# Patient Record
Sex: FEMALE | Race: WHITE | URBAN - METROPOLITAN AREA
[De-identification: names, ages, dates, MRNs, and addresses within clinical notes are randomized per-mention and may not be internally consistent; named-entity substitution may affect disease eponyms.]

---

## 2022-11-01 ENCOUNTER — AMBULATORY SURGERY CENTER (OUTPATIENT)
Dept: URBAN - METROPOLITAN AREA SURGERY 27 | Facility: SURGERY | Age: 67
Setting detail: OPHTHALMOLOGY
End: 2022-11-01
Payer: MEDICARE

## 2022-11-01 DIAGNOSIS — H52.212: ICD-10-CM

## 2022-11-01 DIAGNOSIS — H25.12: ICD-10-CM

## 2022-11-01 PROCEDURE — FEMTO CATARACT LASER: Performed by: OPHTHALMOLOGY

## 2022-11-01 PROCEDURE — 66984 XCAPSL CTRC RMVL W/O ECP: CPT | Performed by: OPHTHALMOLOGY

## 2022-11-02 ENCOUNTER — RX ONLY (RX ONLY)
Age: 67
End: 2022-11-02

## 2022-11-02 ENCOUNTER — OFFICE (OUTPATIENT)
Dept: URBAN - METROPOLITAN AREA CLINIC 12 | Facility: CLINIC | Age: 67
Setting detail: OPHTHALMOLOGY
End: 2022-11-02
Payer: MEDICARE

## 2022-11-02 DIAGNOSIS — Z96.1: ICD-10-CM

## 2022-11-02 PROCEDURE — 99024 POSTOP FOLLOW-UP VISIT: CPT | Performed by: OPTOMETRIST

## 2022-11-02 ASSESSMENT — SPHEQUIV_DERIVED
OS_SPHEQUIV: -2.5
OD_SPHEQUIV: -2.5
OS_SPHEQUIV: -2.875
OD_SPHEQUIV: -2.875

## 2022-11-02 ASSESSMENT — REFRACTION_CURRENTRX
OD_SPHERE: -1.75
OS_VPRISM_DIRECTION: PROGS
OS_CYLINDER: -1.25
OS_AXIS: 137
OD_VPRISM_DIRECTION: PROGS
OD_CYLINDER: -0.25
OS_SPHERE: -2.25
OS_OVR_VA: 20/
OD_AXIS: 92
OD_ADD: +3.00
OS_ADD: +3.00
OD_OVR_VA: 20/

## 2022-11-02 ASSESSMENT — AXIALLENGTH_DERIVED
OS_AL: 23.7404
OD_AL: 23.8338
OD_AL: 23.9838
OS_AL: 23.8893

## 2022-11-02 ASSESSMENT — REFRACTION_MANIFEST
OD_SPHERE: -2.50
OS_VA1: 20/25
OD_VA1: 20/25-1
OD_AXIS: 059
OD_CYLINDER: -0.75
OS_AXIS: 133
OS_SPHERE: -2.00
OS_CYLINDER: -1.75

## 2022-11-02 ASSESSMENT — VISUAL ACUITY
OD_BCVA: 20/20
OS_BCVA: 20/20-2

## 2022-11-02 ASSESSMENT — REFRACTION_AUTOREFRACTION
OS_AXIS: 124
OS_CYLINDER: -0.50
OD_SPHERE: -2.25
OS_SPHERE: -2.25
OD_AXIS: 038
OD_CYLINDER: -0.50

## 2022-11-02 ASSESSMENT — KERATOMETRY
OD_AXISANGLE_DEGREES: 098
OD_K1POWER_DIOPTERS: 45.25
OS_K2POWER_DIOPTERS: 46.00
OD_K2POWER_DIOPTERS: 45.75
OS_K1POWER_DIOPTERS: 45.50
METHOD_AUTO_MANUAL: AUTO
OS_AXISANGLE_DEGREES: 066

## 2022-11-02 ASSESSMENT — CONFRONTATIONAL VISUAL FIELD TEST (CVF)
OS_FINDINGS: FULL
OD_FINDINGS: FULL

## 2022-11-02 ASSESSMENT — TONOMETRY
OD_IOP_MMHG: 21
OS_IOP_MMHG: 18

## 2022-11-08 ENCOUNTER — OFFICE (OUTPATIENT)
Dept: URBAN - METROPOLITAN AREA CLINIC 12 | Facility: CLINIC | Age: 67
Setting detail: OPHTHALMOLOGY
End: 2022-11-08
Payer: MEDICARE

## 2022-11-08 DIAGNOSIS — Z96.1: ICD-10-CM

## 2022-11-08 PROCEDURE — 99024 POSTOP FOLLOW-UP VISIT: CPT | Performed by: OPTOMETRIST

## 2022-11-08 ASSESSMENT — REFRACTION_MANIFEST
OD_AXIS: 059
OS_SPHERE: -2.00
OS_AXIS: 133
OS_CYLINDER: -1.75
OS_VA1: 20/25
OD_VA1: 20/25-1
OD_CYLINDER: -0.75
OD_SPHERE: -2.50

## 2022-11-08 ASSESSMENT — AXIALLENGTH_DERIVED
OD_AL: 24.0313
OS_AL: 23.9838
OS_AL: 23.7842
OD_AL: 23.9307

## 2022-11-08 ASSESSMENT — REFRACTION_AUTOREFRACTION
OD_SPHERE: -2.50
OD_AXIS: 067
OS_SPHERE: -2.25
OD_CYLINDER: -0.25
OS_CYLINDER: -0.25
OS_AXIS: 142

## 2022-11-08 ASSESSMENT — KERATOMETRY
OS_K1POWER_DIOPTERS: 45.25
OS_K2POWER_DIOPTERS: 45.75
OD_K2POWER_DIOPTERS: 45.50
METHOD_AUTO_MANUAL: AUTO
OD_AXISANGLE_DEGREES: 072
OD_K1POWER_DIOPTERS: 45.25
OS_AXISANGLE_DEGREES: 083

## 2022-11-08 ASSESSMENT — REFRACTION_CURRENTRX
OS_CYLINDER: -1.25
OS_VPRISM_DIRECTION: PROGS
OD_CYLINDER: -0.25
OS_SPHERE: -2.25
OD_SPHERE: -1.75
OS_AXIS: 137
OD_OVR_VA: 20/
OS_OVR_VA: 20/
OD_VPRISM_DIRECTION: PROGS
OD_ADD: +3.00
OS_ADD: +3.00
OD_AXIS: 92

## 2022-11-08 ASSESSMENT — CONFRONTATIONAL VISUAL FIELD TEST (CVF)
OD_FINDINGS: FULL
OS_FINDINGS: FULL

## 2022-11-08 ASSESSMENT — SPHEQUIV_DERIVED
OS_SPHEQUIV: -2.375
OS_SPHEQUIV: -2.875
OD_SPHEQUIV: -2.625
OD_SPHEQUIV: -2.875

## 2022-11-08 ASSESSMENT — VISUAL ACUITY
OD_BCVA: 20/200
OS_BCVA: 20/20-1

## 2022-12-02 ENCOUNTER — OFFICE (OUTPATIENT)
Dept: URBAN - METROPOLITAN AREA CLINIC 12 | Facility: CLINIC | Age: 67
Setting detail: OPHTHALMOLOGY
End: 2022-12-02
Payer: MEDICARE

## 2022-12-02 DIAGNOSIS — Z96.1: ICD-10-CM

## 2022-12-02 PROBLEM — H52.7 REFRACTIVE ERROR: Status: ACTIVE | Noted: 2022-12-02

## 2022-12-02 PROCEDURE — 99024 POSTOP FOLLOW-UP VISIT: CPT | Performed by: OPTOMETRIST

## 2022-12-02 ASSESSMENT — REFRACTION_CURRENTRX
OD_SPHERE: -1.75
OS_ADD: +3.00
OD_OVR_VA: 20/
OS_SPHERE: -2.75
OD_SPHERE: -2.75
OD_ADD: +3.00
OS_VPRISM_DIRECTION: PROGS
OD_AXIS: 060
OD_CYLINDER: -0.25
OS_CYLINDER: -1.00
OS_OVR_VA: 20/
OS_AXIS: 137
OS_OVR_VA: 20/
OD_ADD: +2.75
OD_VPRISM_DIRECTION: PROGS
OS_ADD: +2.75
OS_CYLINDER: -1.25
OD_OVR_VA: 20/
OS_SPHERE: -2.25
OD_AXIS: 92
OS_AXIS: 128
OD_CYLINDER: -1.00

## 2022-12-02 ASSESSMENT — REFRACTION_AUTOREFRACTION
OD_CYLINDER: -0.50
OD_AXIS: 80
OD_SPHERE: -2.50
OS_CYLINDER: -0.25
OS_SPHERE: -2.50
OS_AXIS: 141

## 2022-12-02 ASSESSMENT — KERATOMETRY
OD_K1POWER_DIOPTERS: 45.25
OD_K2POWER_DIOPTERS: 45.25
OS_AXISANGLE_DEGREES: 76
OD_AXISANGLE_DEGREES: 90
METHOD_AUTO_MANUAL: AUTO
OS_K2POWER_DIOPTERS: 45.75
OS_K1POWER_DIOPTERS: 45.25

## 2022-12-02 ASSESSMENT — VISUAL ACUITY
OS_BCVA: 20/100
OD_BCVA: 20/150

## 2022-12-02 ASSESSMENT — SPHEQUIV_DERIVED
OS_SPHEQUIV: -2.625
OD_SPHEQUIV: -2.75
OD_SPHEQUIV: -2.75
OS_SPHEQUIV: -2.75

## 2022-12-02 ASSESSMENT — REFRACTION_MANIFEST
OD_ADD: +2.75
OS_CYLINDER: -0.50
OD_CYLINDER: -0.50
OS_VA1: 20/25
OS_AXIS: 140
OD_VA1: 20/25
OD_SPHERE: -2.50
OS_ADD: +2.75
OD_AXIS: 080
OS_SPHERE: -2.50

## 2022-12-02 ASSESSMENT — TONOMETRY
OS_IOP_MMHG: 18
OD_IOP_MMHG: 20

## 2022-12-02 ASSESSMENT — AXIALLENGTH_DERIVED
OS_AL: 23.8836
OS_AL: 23.9336
OD_AL: 24.0285
OD_AL: 24.0285

## 2022-12-02 ASSESSMENT — CONFRONTATIONAL VISUAL FIELD TEST (CVF)
OD_FINDINGS: FULL
OS_FINDINGS: FULL

## 2023-03-03 ENCOUNTER — OFFICE (OUTPATIENT)
Dept: URBAN - METROPOLITAN AREA CLINIC 12 | Facility: CLINIC | Age: 68
Setting detail: OPHTHALMOLOGY
End: 2023-03-03
Payer: MEDICARE

## 2023-03-03 DIAGNOSIS — Z96.1: ICD-10-CM

## 2023-03-03 DIAGNOSIS — H43.22: ICD-10-CM

## 2023-03-03 DIAGNOSIS — H35.373: ICD-10-CM

## 2023-03-03 DIAGNOSIS — H43.813: ICD-10-CM

## 2023-03-03 DIAGNOSIS — H26.491: ICD-10-CM

## 2023-03-03 PROBLEM — H26.493 POSTERIOR CAPSULAR OPACIFICATION; BOTH EYES: Status: ACTIVE | Noted: 2023-03-03

## 2023-03-03 PROCEDURE — 92134 CPTRZ OPH DX IMG PST SGM RTA: CPT | Performed by: OPTOMETRIST

## 2023-03-03 PROCEDURE — 92014 COMPRE OPH EXAM EST PT 1/>: CPT | Performed by: OPTOMETRIST

## 2023-03-03 ASSESSMENT — VISUAL ACUITY
OS_BCVA: 20/20-3
OD_BCVA: 20/25-2

## 2023-03-03 ASSESSMENT — REFRACTION_AUTOREFRACTION
OS_CYLINDER: -0.25
OD_SPHERE: -2.25
OD_CYLINDER: -0.50
OS_AXIS: 110
OD_AXIS: 091
OS_SPHERE: -2.25

## 2023-03-03 ASSESSMENT — REFRACTION_CURRENTRX
OD_OVR_VA: 20/
OD_CYLINDER: -0.50
OS_AXIS: 128
OD_CYLINDER: -1.00
OD_VPRISM_DIRECTION: PROGS
OD_ADD: +2.75
OS_VPRISM_DIRECTION: PROGS
OD_AXIS: 080
OS_SPHERE: -2.75
OS_SPHERE: -2.50
OD_ADD: +2.75
OD_OVR_VA: 20/
OS_CYLINDER: -1.00
OS_AXIS: 140
OS_OVR_VA: 20/
OS_CYLINDER: -0.50
OD_AXIS: 060
OD_SPHERE: -2.50
OS_OVR_VA: 20/
OS_ADD: +2.75
OD_SPHERE: -2.75
OS_ADD: +2.75

## 2023-03-03 ASSESSMENT — KERATOMETRY
OD_AXISANGLE_DEGREES: 090
OS_K2POWER_DIOPTERS: 45.75
METHOD_AUTO_MANUAL: AUTO
OD_K2POWER_DIOPTERS: 45.25
OD_K1POWER_DIOPTERS: 45.25
OS_AXISANGLE_DEGREES: 027
OS_K1POWER_DIOPTERS: 45.50

## 2023-03-03 ASSESSMENT — REFRACTION_MANIFEST
OS_CYLINDER: -0.50
OD_VA1: 20/25
OS_ADD: +2.75
OD_SPHERE: -2.50
OS_VA1: 20/25
OD_ADD: +2.75
OD_AXIS: 080
OS_SPHERE: -2.50
OD_CYLINDER: -0.50
OS_AXIS: 140

## 2023-03-03 ASSESSMENT — SPHEQUIV_DERIVED
OD_SPHEQUIV: -2.5
OD_SPHEQUIV: -2.75
OS_SPHEQUIV: -2.375
OS_SPHEQUIV: -2.75

## 2023-03-03 ASSESSMENT — TONOMETRY: OS_IOP_MMHG: 19

## 2023-03-03 ASSESSMENT — CONFRONTATIONAL VISUAL FIELD TEST (CVF)
OS_FINDINGS: FULL
OD_FINDINGS: FULL

## 2023-03-03 ASSESSMENT — AXIALLENGTH_DERIVED
OD_AL: 24.0285
OS_AL: 23.7376
OS_AL: 23.8864
OD_AL: 23.9278

## 2023-05-08 ENCOUNTER — FORM ENCOUNTER (OUTPATIENT)
Age: 68
End: 2023-05-08

## 2023-05-08 ENCOUNTER — APPOINTMENT (OUTPATIENT)
Dept: ORTHOPEDIC SURGERY | Facility: CLINIC | Age: 68
End: 2023-05-08
Payer: OTHER MISCELLANEOUS

## 2023-05-08 VITALS — BODY MASS INDEX: 26.46 KG/M2 | HEIGHT: 64 IN | WEIGHT: 155 LBS

## 2023-05-08 DIAGNOSIS — Z87.891 PERSONAL HISTORY OF NICOTINE DEPENDENCE: ICD-10-CM

## 2023-05-08 DIAGNOSIS — Z78.9 OTHER SPECIFIED HEALTH STATUS: ICD-10-CM

## 2023-05-08 PROBLEM — Z00.00 ENCOUNTER FOR PREVENTIVE HEALTH EXAMINATION: Status: ACTIVE | Noted: 2023-05-08

## 2023-05-08 PROCEDURE — 73564 X-RAY EXAM KNEE 4 OR MORE: CPT | Mod: LT

## 2023-05-08 PROCEDURE — 99204 OFFICE O/P NEW MOD 45 MIN: CPT

## 2023-05-08 RX ORDER — IBUPROFEN 800 MG/1
800 TABLET, FILM COATED ORAL TWICE DAILY
Qty: 60 | Refills: 0 | Status: ACTIVE | COMMUNITY
Start: 2023-05-08 | End: 1900-01-01

## 2023-05-08 NOTE — DISCUSSION/SUMMARY
[de-identified] : MRI OF THE LEFT KNEE TO EVAL Meniscus TEAR\par Patient  will follow up with MRI results.\par Out of work work till Monday \par note was provided \par \par \par Due to this patient expressing significant pain, I am prescribing an iceless cold/heat compression therapy device for at home use to be used 3-5 times per day at 40 degrees for 35 days. I would like my patient to begin with simultaneous cold & compression therapy at 10mm pressure. At the patients follow up I will determine whether they should continue with cold, or if they should transition to contrast cold/heat compression therapy. Unlike a conventional cold therapy unit that requires ice, the ThermX iceless device is set to a prescribed temperature that it will remain throughout the entire duration of use, whether that be cold compression, heat compression, or contrast compression. Cold therapy units that depend on ice melt over a very short period and do not provide compression which limits the compliance and effectiveness for pain/inflammation reduction that I am targeting for my patient. I have reached out to [facility] to supply this device as they are the exclusive provider of the ThermX and the patient will be contacted and instructed on how to utilize the device.\par \par \par -----------------------------------------------\par Home Exercise\par The patient is instructed on a home exercise program.\par \par LAURA NAM Acting as a Scribe for Dr. Vitale\par I, Laura Nam, attest that this documentation has been prepared under the direction and in the presence of Provider Zander Vitale MD.\par \par Activity Modification\par The patient was advised to modify their activities.\par \par Dx / Natural History\par The patient was advised of the diagnosis.  The natural history of the pathology was explained in full to the patient in layman's terms.  Several different treatment options were discussed and explained in full to the patient including the risks and benefits of both surgical and non-surgical treatments.  All questions and concerns were answered.\par \par Pain Guide Activities\par The patient was advised to let pain guide the gradual advancement of activities.\par \par RICE\par I explained to the patient that rest, ice, compression, and elevation would benefit them.  They may return to activity after follow-up or when they no longer have any pain.\par \par The patient's current medication management of their orthopedic diagnosis was reviewed today:\par (1) We discussed a comprehensive treatment plan that included possible pharmaceutical management involving the use of prescription strength medications including but not limited to options such as oral Naprosyn 500mg BID, once daily Meloxicam 15 mg, or 500-650 mg Tylenol versus over the counter oral medications and topical prescription NSAID Pennsaid vs over the counter Voltaren gel.\par (2) There is a moderate risk of morbidity with further treatment, especially from use of prescription strength medications and possible side effects of these medications which include upset stomach with oral medications, skin reactions to topical medications and cardiac/renal issues with long term use.\par (3) I recommended that the patient follow-up with their medical physician to discuss any significant specific potential issues with long term medication use such as interactions with current medications or with exacerbation of underlying medical comorbidities.\par (4) The benefits and risks associated with use of injectable, oral or topical, prescription and over the counter anti-inflammatory medications were discussed with the patient. The patient voiced understanding of the risks including but not limited to bleeding, stroke, kidney dysfunction, heart disease, and were referred to the black box warning label for further information.\par \par \par \par \par \par

## 2023-05-08 NOTE — HISTORY OF PRESENT ILLNESS
[de-identified] : The patient is a 68 year old Right hand dominant female who presents today complaining of L knee pain.  \par Date of Injury/Onset: 05/03/23\par Pain:    At Rest: 0/10 \par With Activity:  4/10 \par Mechanism of injury: Patient reported attempting to turn on the involved leg at work and felt acute onset pain in the involved knee\par This is a Work Related Injury being treated under Worker's Compensation.\par This is NOT an athletic injury occurring associated with an interscholastic or organized sports team.\par Quality of symptoms: Sharp, burning\par Improves with: Rest\par Worse with: walking, rotational motions\par Prior treatment: Ice \par Prior Imaging: N/A\par Out of work/sport: Yes, since: 05/05/23  \par School/Sport/Position/Occupation: [employer]\par Additional Information: None\par \par pt does report prior hx of meniscectomy on this knee\par injury at [employer] 5/3/23 was from a twisting knee motion\par

## 2023-05-08 NOTE — WORK
[Was the competent medical cause of the injury] : was the competent medical cause of the injury [Are consistent with the injury] : are consistent with the injury [Consistent with my objective findings] : consistent with my objective findings [I provided the services listed above] :  I provided the services listed above.

## 2023-05-08 NOTE — PHYSICAL EXAM
[Left] : left knee [NL (0)] : extension 0 degrees [5___] : hamstring 5[unfilled]/5 [Equivocal] : equivocal Lux [] : mildly antalgic [de-identified] : Neurologic: normal sensation, normal mood and affect, orientated and able to communicate\par \par Left Knee:\par medial joint line tenderness\par +medial Chiqui's\par +locking\par Full ROM \par Pain with full extension \par Medial buckling\par  [TWNoteComboBox7] : flexion 125 degrees

## 2023-05-09 ENCOUNTER — APPOINTMENT (OUTPATIENT)
Dept: MRI IMAGING | Facility: CLINIC | Age: 68
End: 2023-05-09
Payer: OTHER MISCELLANEOUS

## 2023-05-09 PROCEDURE — 73721 MRI JNT OF LWR EXTRE W/O DYE: CPT | Mod: LT

## 2023-05-15 ENCOUNTER — APPOINTMENT (OUTPATIENT)
Dept: ORTHOPEDIC SURGERY | Facility: CLINIC | Age: 68
End: 2023-05-15
Payer: OTHER MISCELLANEOUS

## 2023-05-15 VITALS — BODY MASS INDEX: 26.46 KG/M2 | WEIGHT: 155 LBS | HEIGHT: 64 IN

## 2023-05-15 PROCEDURE — 99214 OFFICE O/P EST MOD 30 MIN: CPT

## 2023-05-15 NOTE — HISTORY OF PRESENT ILLNESS
[de-identified] : The patient is a 68 year old Right hand dominant female who presents today complaining of L knee pain.  \par Date of Injury/Onset: 05/03/23\par Pain:    At Rest: 0/10 \par With Activity:  4/10 when moving in a certain way\par Mechanism of injury: Patient reported attempting to turn on the involved leg at work and felt acute onset pain in the involved knee\par This is a Work Related Injury being treated under Worker's Compensation.\par This is NOT an athletic injury occurring associated with an interscholastic or organized sports team.\par Quality of symptoms: Sharp, burning\par Improves with: Rest\par Worse with: walking, rotational motions\par Prior treatment: Ice \par Prior Imaging: N/A - 5/15 ->  OC MRI on 5/9/2023\par Out of work/sport: Yes, since: 05/05/23  \par School/Sport/Position/Occupation: [employer]\par Additional Information: None\par \par pt does report prior hx of meniscectomy on this knee\par injury at [employer] 5/3/23 was from a twisting knee motion\par

## 2023-05-15 NOTE — PHYSICAL EXAM
[Left] : left knee [NL (0)] : extension 0 degrees [5___] : hamstring 5[unfilled]/5 [Equivocal] : equivocal Lux [] : mildly antalgic [de-identified] : Neurologic: normal sensation, normal mood and affect, orientated and able to communicate\par \par Left Knee:\par medial joint line tenderness\par +medial Chiqui's\par +locking\par Full ROM \par Pain with full extension \par Medial buckling\par  [TWNoteComboBox7] : flexion 125 degrees

## 2023-05-15 NOTE — DATA REVIEWED
[FreeTextEntry1] : 05/08/23\par OC X RAY OF THE LEFT KNEE:\par mild medial joint face narrowing.\par \par \par 05/09/23\par [facility] MRI OF THE  LEFT KNEE:\par 1. 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 Chronic ligament sprains, MCL laxity, and extensor mechanism tendinopathy.\par 3. Chondral loss and mild osteophytes in the lateral and patellofemoral compartments.\par 4. No evidence of acute fracture or lateral meniscal tear.\par 5. Mild-to-moderate effusion and synovitis.

## 2023-05-15 NOTE — DISCUSSION/SUMMARY
[de-identified] : Reviewed all images with patient.\par Packet of exercises provided for home strengthening and/or stretching.\par Continue wearing compression sleeve.\par Patient will follow up as needed.\par \par \par \par -----------------------------------------------\par Home Exercise\par The patient is instructed on a home exercise program.\par \par LAURA NAM Acting as a Scribe for Dr. Vitale\par I, Laura Nam, attest that this documentation has been prepared under the direction and in the presence of Provider Zander Vitale MD.\par \par Activity Modification\par The patient was advised to modify their activities.\par \par Dx / Natural History\par The patient was advised of the diagnosis.  The natural history of the pathology was explained in full to the patient in layman's terms.  Several different treatment options were discussed and explained in full to the patient including the risks and benefits of both surgical and non-surgical treatments.  All questions and concerns were answered.\par \par Pain Guide Activities\par The patient was advised to let pain guide the gradual advancement of activities.\par \par RICE\par I explained to the patient that rest, ice, compression, and elevation would benefit them.  They may return to activity after follow-up or when they no longer have any pain.\par \par The patient's current medication management of their orthopedic diagnosis was reviewed today:\par (1) We discussed a comprehensive treatment plan that included possible pharmaceutical management involving the use of prescription strength medications including but not limited to options such as oral Naprosyn 500mg BID, once daily Meloxicam 15 mg, or 500-650 mg Tylenol versus over the counter oral medications and topical prescription NSAID Pennsaid vs over the counter Voltaren gel.\par (2) There is a moderate risk of morbidity with further treatment, especially from use of prescription strength medications and possible side effects of these medications which include upset stomach with oral medications, skin reactions to topical medications and cardiac/renal issues with long term use.\par (3) I recommended that the patient follow-up with their medical physician to discuss any significant specific potential issues with long term medication use such as interactions with current medications or with exacerbation of underlying medical comorbidities.\par (4) The benefits and risks associated with use of injectable, oral or topical, prescription and over the counter anti-inflammatory medications were discussed with the patient. The patient voiced understanding of the risks including but not limited to bleeding, stroke, kidney dysfunction, heart disease, and were referred to the black box warning label for further information.\par \par \par

## 2023-08-14 ENCOUNTER — OFFICE (OUTPATIENT)
Dept: URBAN - METROPOLITAN AREA CLINIC 12 | Facility: CLINIC | Age: 68
Setting detail: OPHTHALMOLOGY
End: 2023-08-14
Payer: MEDICARE

## 2023-08-14 DIAGNOSIS — H35.373: ICD-10-CM

## 2023-08-14 DIAGNOSIS — H43.813: ICD-10-CM

## 2023-08-14 DIAGNOSIS — H26.493: ICD-10-CM

## 2023-08-14 DIAGNOSIS — H43.22: ICD-10-CM

## 2023-08-14 PROCEDURE — 99214 OFFICE O/P EST MOD 30 MIN: CPT | Performed by: OPHTHALMOLOGY

## 2023-08-14 PROCEDURE — 92134 CPTRZ OPH DX IMG PST SGM RTA: CPT | Performed by: OPHTHALMOLOGY

## 2023-08-14 ASSESSMENT — KERATOMETRY
OS_K1POWER_DIOPTERS: 45.50
OD_K2POWER_DIOPTERS: 45.50
OS_AXISANGLE_DEGREES: 071
METHOD_AUTO_MANUAL: AUTO
OD_AXISANGLE_DEGREES: 080
OD_K1POWER_DIOPTERS: 45.25
OS_K2POWER_DIOPTERS: 45.75

## 2023-08-14 ASSESSMENT — REFRACTION_CURRENTRX
OD_SPHERE: -2.50
OD_CYLINDER: -0.50
OD_ADD: +2.75
OS_CYLINDER: -1.00
OS_AXIS: 140
OS_SPHERE: -2.75
OD_OVR_VA: 20/
OS_CYLINDER: -0.50
OS_OVR_VA: 20/
OS_VPRISM_DIRECTION: PROGS
OS_SPHERE: -2.50
OS_OVR_VA: 20/
OD_AXIS: 060
OD_SPHERE: -2.75
OD_OVR_VA: 20/
OD_AXIS: 080
OS_AXIS: 128
OD_VPRISM_DIRECTION: PROGS
OD_CYLINDER: -1.00
OD_ADD: +2.75
OS_ADD: +2.75
OS_ADD: +2.75

## 2023-08-14 ASSESSMENT — REFRACTION_MANIFEST
OS_VA1: 20/25
OD_AXIS: 080
OS_ADD: +2.75
OS_AXIS: 140
OS_SPHERE: -2.50
OD_SPHERE: -2.50
OD_CYLINDER: -0.50
OD_ADD: +2.75
OD_VA1: 20/25
OS_CYLINDER: -0.50

## 2023-08-14 ASSESSMENT — AXIALLENGTH_DERIVED
OD_AL: 23.8807
OS_AL: 23.8864
OD_AL: 23.9809
OS_AL: 23.787

## 2023-08-14 ASSESSMENT — VISUAL ACUITY
OS_BCVA: 20/20
OD_BCVA: 20/20-2

## 2023-08-14 ASSESSMENT — SPHEQUIV_DERIVED
OS_SPHEQUIV: -2.5
OD_SPHEQUIV: -2.75
OS_SPHEQUIV: -2.75
OD_SPHEQUIV: -2.5

## 2023-08-14 ASSESSMENT — REFRACTION_AUTOREFRACTION
OD_CYLINDER: -0.50
OD_SPHERE: -2.25
OS_AXIS: 111
OD_AXIS: 102
OS_SPHERE: -2.25
OS_CYLINDER: -0.50

## 2023-08-14 ASSESSMENT — CONFRONTATIONAL VISUAL FIELD TEST (CVF)
OD_FINDINGS: FULL
OS_FINDINGS: FULL

## 2023-08-14 ASSESSMENT — TONOMETRY: OS_IOP_MMHG: 20

## 2023-09-15 ENCOUNTER — RX ONLY (RX ONLY)
Age: 68
End: 2023-09-15

## 2023-09-15 ENCOUNTER — ASC (OUTPATIENT)
Dept: URBAN - METROPOLITAN AREA SURGERY 8 | Facility: SURGERY | Age: 68
Setting detail: OPHTHALMOLOGY
End: 2023-09-15
Payer: MEDICARE

## 2023-09-15 DIAGNOSIS — H26.492: ICD-10-CM

## 2023-09-15 PROCEDURE — 66821 AFTER CATARACT LASER SURGERY: CPT | Performed by: OPHTHALMOLOGY

## 2023-09-15 ASSESSMENT — REFRACTION_CURRENTRX
OD_ADD: +2.75
OD_AXIS: 060
OS_AXIS: 140
OD_OVR_VA: 20/
OS_ADD: +2.75
OS_VPRISM_DIRECTION: PROGS
OD_SPHERE: -2.50
OD_AXIS: 080
OS_CYLINDER: -0.50
OS_SPHERE: -2.50
OS_AXIS: 128
OS_CYLINDER: -1.00
OD_ADD: +2.75
OD_OVR_VA: 20/
OS_SPHERE: -2.75
OD_CYLINDER: -0.50
OS_OVR_VA: 20/
OD_CYLINDER: -1.00
OD_VPRISM_DIRECTION: PROGS
OD_SPHERE: -2.75
OS_ADD: +2.75
OS_OVR_VA: 20/

## 2023-09-15 ASSESSMENT — REFRACTION_MANIFEST
OS_AXIS: 140
OS_ADD: +2.75
OS_SPHERE: -2.50
OD_SPHERE: -2.50
OD_VA1: 20/25
OD_ADD: +2.75
OS_CYLINDER: -0.50
OD_AXIS: 080
OS_VA1: 20/25
OD_CYLINDER: -0.50

## 2023-09-15 ASSESSMENT — REFRACTION_AUTOREFRACTION
OD_AXIS: 102
OD_SPHERE: -2.25
OD_CYLINDER: -0.50
OS_CYLINDER: -0.50
OS_SPHERE: -2.25
OS_AXIS: 111

## 2023-09-15 ASSESSMENT — AXIALLENGTH_DERIVED
OS_AL: 23.8864
OD_AL: 23.9809
OD_AL: 23.8807
OS_AL: 23.787

## 2023-09-15 ASSESSMENT — KERATOMETRY
OS_K1POWER_DIOPTERS: 45.50
OD_AXISANGLE_DEGREES: 080
METHOD_AUTO_MANUAL: AUTO
OD_K2POWER_DIOPTERS: 45.50
OS_AXISANGLE_DEGREES: 071
OS_K2POWER_DIOPTERS: 45.75
OD_K1POWER_DIOPTERS: 45.25

## 2023-09-15 ASSESSMENT — SPHEQUIV_DERIVED
OS_SPHEQUIV: -2.75
OS_SPHEQUIV: -2.5
OD_SPHEQUIV: -2.5
OD_SPHEQUIV: -2.75

## 2023-09-15 ASSESSMENT — CONFRONTATIONAL VISUAL FIELD TEST (CVF)
OD_FINDINGS: FULL
OS_FINDINGS: FULL

## 2023-09-15 ASSESSMENT — VISUAL ACUITY
OS_BCVA: 20/20
OD_BCVA: 20/20-2

## 2023-09-28 ENCOUNTER — OFFICE (OUTPATIENT)
Dept: URBAN - METROPOLITAN AREA CLINIC 12 | Facility: CLINIC | Age: 68
Setting detail: OPHTHALMOLOGY
End: 2023-09-28
Payer: MEDICARE

## 2023-09-28 DIAGNOSIS — H35.373: ICD-10-CM

## 2023-09-28 DIAGNOSIS — H26.492: ICD-10-CM

## 2023-09-28 PROCEDURE — 99024 POSTOP FOLLOW-UP VISIT: CPT | Performed by: OPTOMETRIST

## 2023-09-28 ASSESSMENT — REFRACTION_MANIFEST
OD_SPHERE: -2.50
OS_ADD: +2.75
OS_CYLINDER: -0.50
OD_CYLINDER: -0.50
OS_AXIS: 140
OS_SPHERE: -2.50
OD_ADD: +2.75
OS_VA1: 20/25
OD_AXIS: 080
OD_VA1: 20/25

## 2023-09-28 ASSESSMENT — REFRACTION_CURRENTRX
OD_OVR_VA: 20/
OD_SPHERE: -2.50
OD_AXIS: 080
OS_OVR_VA: 20/
OD_CYLINDER: -0.50
OD_AXIS: 060
OS_CYLINDER: -1.00
OS_AXIS: 128
OD_CYLINDER: -1.00
OD_SPHERE: -2.75
OS_ADD: +2.75
OD_OVR_VA: 20/
OS_VPRISM_DIRECTION: PROGS
OD_ADD: +2.75
OS_ADD: +2.75
OD_ADD: +2.75
OD_VPRISM_DIRECTION: PROGS
OS_OVR_VA: 20/
OS_SPHERE: -2.50
OS_SPHERE: -2.75
OS_AXIS: 140
OS_CYLINDER: -0.50

## 2023-09-28 ASSESSMENT — REFRACTION_AUTOREFRACTION
OS_AXIS: 092
OS_CYLINDER: -0.50
OS_SPHERE: -2.50
OD_CYLINDER: -0.25
OD_SPHERE: -2.75
OD_AXIS: 084

## 2023-09-28 ASSESSMENT — CONFRONTATIONAL VISUAL FIELD TEST (CVF)
OS_FINDINGS: FULL
OD_FINDINGS: FULL

## 2023-09-28 ASSESSMENT — KERATOMETRY
OS_K2POWER_DIOPTERS: 46.00
OD_K1POWER_DIOPTERS: 45.75
OD_K2POWER_DIOPTERS: 45.75
OD_AXISANGLE_DEGREES: 090
METHOD_AUTO_MANUAL: AUTO
OS_AXISANGLE_DEGREES: 042
OS_K1POWER_DIOPTERS: 45.75

## 2023-09-28 ASSESSMENT — VISUAL ACUITY
OD_BCVA: 20/30-2
OS_BCVA: 20/20

## 2023-09-28 ASSESSMENT — AXIALLENGTH_DERIVED
OD_AL: 23.8394
OS_AL: 23.7927
OS_AL: 23.7927
OD_AL: 23.8893

## 2023-09-28 ASSESSMENT — SPHEQUIV_DERIVED
OS_SPHEQUIV: -2.75
OS_SPHEQUIV: -2.75
OD_SPHEQUIV: -2.875
OD_SPHEQUIV: -2.75

## 2024-09-23 ENCOUNTER — OFFICE (OUTPATIENT)
Dept: URBAN - METROPOLITAN AREA CLINIC 12 | Facility: CLINIC | Age: 69
Setting detail: OPHTHALMOLOGY
End: 2024-09-23
Payer: COMMERCIAL

## 2024-09-23 DIAGNOSIS — H35.373: ICD-10-CM

## 2024-09-23 DIAGNOSIS — H43.22: ICD-10-CM

## 2024-09-23 DIAGNOSIS — H43.813: ICD-10-CM

## 2024-09-23 PROCEDURE — 92014 COMPRE OPH EXAM EST PT 1/>: CPT | Performed by: STUDENT IN AN ORGANIZED HEALTH CARE EDUCATION/TRAINING PROGRAM

## 2024-09-23 PROCEDURE — 92250 FUNDUS PHOTOGRAPHY W/I&R: CPT | Performed by: STUDENT IN AN ORGANIZED HEALTH CARE EDUCATION/TRAINING PROGRAM

## 2024-09-23 ASSESSMENT — CONFRONTATIONAL VISUAL FIELD TEST (CVF)
OD_FINDINGS: FULL
OS_FINDINGS: FULL

## 2024-11-27 ENCOUNTER — OFFICE (OUTPATIENT)
Dept: URBAN - METROPOLITAN AREA CLINIC 94 | Facility: CLINIC | Age: 69
Setting detail: OPHTHALMOLOGY
End: 2024-11-27
Payer: COMMERCIAL

## 2024-11-27 DIAGNOSIS — H35.373: ICD-10-CM

## 2024-11-27 PROBLEM — H35.372 EPIRETINAL MEMBRANE; RIGHT EYE, LEFT EYE, BOTH EYES: Status: ACTIVE | Noted: 2024-11-27

## 2024-11-27 PROBLEM — H35.371 EPIRETINAL MEMBRANE; RIGHT EYE, LEFT EYE, BOTH EYES: Status: ACTIVE | Noted: 2024-11-27

## 2024-11-27 PROCEDURE — 92012 INTRM OPH EXAM EST PATIENT: CPT | Performed by: OPHTHALMOLOGY

## 2024-11-27 PROCEDURE — 92134 CPTRZ OPH DX IMG PST SGM RTA: CPT | Performed by: OPHTHALMOLOGY

## 2024-11-27 ASSESSMENT — KERATOMETRY
OD_K2POWER_DIOPTERS: 45.50
OS_K1POWER_DIOPTERS: 45.50
OS_K2POWER_DIOPTERS: 45.75
OD_AXISANGLE_DEGREES: 090
OS_AXISANGLE_DEGREES: 023
METHOD_AUTO_MANUAL: AUTO
OD_K1POWER_DIOPTERS: 45.50

## 2024-11-27 ASSESSMENT — REFRACTION_CURRENTRX
OS_ADD: +2.75
OS_SPHERE: -2.75
OS_OVR_VA: 20/
OD_AXIS: 078
OS_SPHERE: -2.57
OD_CYLINDER: -0.50
OD_AXIS: 060
OD_SPHERE: -2.75
OS_OVR_VA: 20/
OD_ADD: +2.75
OD_SPHERE: -2.75
OS_CYLINDER: -1.00
OS_ADD: +2.75
OS_AXIS: 145
OD_OVR_VA: 20/
OD_ADD: +2.75
OS_CYLINDER: -0.50
OD_OVR_VA: 20/
OD_CYLINDER: -1.00
OS_VPRISM_DIRECTION: PROGS
OD_VPRISM_DIRECTION: PROGS
OS_AXIS: 128

## 2024-11-27 ASSESSMENT — REFRACTION_AUTOREFRACTION
OS_AXIS: 099
OS_SPHERE: -2.50
OD_AXIS: 096
OD_SPHERE: -2.50
OD_CYLINDER: -0.25
OS_CYLINDER: -0.25

## 2024-11-27 ASSESSMENT — VISUAL ACUITY
OS_BCVA: 20/25
OD_BCVA: 20/25

## 2024-11-27 ASSESSMENT — CONFRONTATIONAL VISUAL FIELD TEST (CVF)
OS_FINDINGS: FULL
OD_FINDINGS: FULL

## 2024-11-27 ASSESSMENT — TONOMETRY: OS_IOP_MMHG: 21

## 2025-01-03 ENCOUNTER — ASC (OUTPATIENT)
Dept: URBAN - METROPOLITAN AREA SURGERY 8 | Facility: SURGERY | Age: 70
Setting detail: OPHTHALMOLOGY
End: 2025-01-03
Payer: COMMERCIAL

## 2025-01-03 DIAGNOSIS — H35.372: ICD-10-CM

## 2025-01-03 PROCEDURE — 67041 VIT FOR MACULAR PUCKER: CPT | Mod: LT | Performed by: OPHTHALMOLOGY

## 2025-01-04 ENCOUNTER — OFFICE (OUTPATIENT)
Dept: URBAN - METROPOLITAN AREA CLINIC 94 | Facility: CLINIC | Age: 70
Setting detail: OPHTHALMOLOGY
End: 2025-01-04
Payer: COMMERCIAL

## 2025-01-04 ENCOUNTER — RX ONLY (RX ONLY)
Age: 70
End: 2025-01-04

## 2025-01-04 DIAGNOSIS — H35.371: ICD-10-CM

## 2025-01-04 DIAGNOSIS — H35.373: ICD-10-CM

## 2025-01-04 DIAGNOSIS — H35.372: ICD-10-CM

## 2025-01-04 PROCEDURE — 99024 POSTOP FOLLOW-UP VISIT: CPT | Performed by: SPECIALIST

## 2025-01-04 ASSESSMENT — REFRACTION_AUTOREFRACTION
OD_CYLINDER: -0.25
OS_SPHERE: -2.50
OS_AXIS: 099
OD_AXIS: 096
OS_CYLINDER: -0.25
OD_SPHERE: -2.50

## 2025-01-04 ASSESSMENT — KERATOMETRY
OD_K2POWER_DIOPTERS: 45.50
OD_AXISANGLE_DEGREES: 090
OD_K1POWER_DIOPTERS: 45.50
METHOD_AUTO_MANUAL: AUTO
OS_K1POWER_DIOPTERS: 45.50
OS_K2POWER_DIOPTERS: 45.75
OS_AXISANGLE_DEGREES: 023

## 2025-01-04 ASSESSMENT — VISUAL ACUITY
OS_BCVA: 20/20
OD_BCVA: 20/30-2

## 2025-01-04 ASSESSMENT — TONOMETRY
OD_IOP_MMHG: 21
OS_IOP_MMHG: 16

## 2025-01-07 ENCOUNTER — OFFICE (OUTPATIENT)
Dept: URBAN - METROPOLITAN AREA CLINIC 94 | Facility: CLINIC | Age: 70
Setting detail: OPHTHALMOLOGY
End: 2025-01-07
Payer: COMMERCIAL

## 2025-01-07 DIAGNOSIS — H35.371: ICD-10-CM

## 2025-01-07 DIAGNOSIS — H35.372: ICD-10-CM

## 2025-01-07 DIAGNOSIS — H35.373: ICD-10-CM

## 2025-01-07 PROCEDURE — 99024 POSTOP FOLLOW-UP VISIT: CPT | Performed by: OPHTHALMOLOGY

## 2025-01-07 ASSESSMENT — KERATOMETRY
OS_K2POWER_DIOPTERS: 45.75
OS_K1POWER_DIOPTERS: 45.50
OS_AXISANGLE_DEGREES: 023
METHOD_AUTO_MANUAL: AUTO
OD_AXISANGLE_DEGREES: 090
OD_K1POWER_DIOPTERS: 45.50
OD_K2POWER_DIOPTERS: 45.50

## 2025-01-07 ASSESSMENT — REFRACTION_AUTOREFRACTION
OS_SPHERE: -2.50
OS_CYLINDER: -0.25
OD_CYLINDER: -0.25
OS_AXIS: 099
OD_SPHERE: -2.50
OD_AXIS: 096

## 2025-01-07 ASSESSMENT — TONOMETRY
OS_IOP_MMHG: 21
OD_IOP_MMHG: 20

## 2025-01-07 ASSESSMENT — VISUAL ACUITY
OD_BCVA: 20/25
OS_BCVA: 20/20

## 2025-01-07 ASSESSMENT — CONFRONTATIONAL VISUAL FIELD TEST (CVF)
OS_FINDINGS: FULL
OD_FINDINGS: FULL

## 2025-01-28 ENCOUNTER — OFFICE (OUTPATIENT)
Dept: URBAN - METROPOLITAN AREA CLINIC 94 | Facility: CLINIC | Age: 70
Setting detail: OPHTHALMOLOGY
End: 2025-01-28
Payer: COMMERCIAL

## 2025-01-28 DIAGNOSIS — H35.371: ICD-10-CM

## 2025-01-28 PROCEDURE — 99024 POSTOP FOLLOW-UP VISIT: CPT | Performed by: OPHTHALMOLOGY

## 2025-01-28 ASSESSMENT — REFRACTION_AUTOREFRACTION
OS_CYLINDER: -0.25
OS_AXIS: 099
OD_CYLINDER: -0.25
OD_SPHERE: -2.50
OS_SPHERE: -2.50
OD_AXIS: 096

## 2025-01-28 ASSESSMENT — KERATOMETRY
OD_K1POWER_DIOPTERS: 45.50
OD_K2POWER_DIOPTERS: 45.50
OS_K1POWER_DIOPTERS: 45.50
OS_AXISANGLE_DEGREES: 023
OD_AXISANGLE_DEGREES: 090
METHOD_AUTO_MANUAL: AUTO
OS_K2POWER_DIOPTERS: 45.75

## 2025-01-28 ASSESSMENT — CONFRONTATIONAL VISUAL FIELD TEST (CVF)
OS_FINDINGS: FULL
OD_FINDINGS: FULL

## 2025-01-28 ASSESSMENT — TONOMETRY
OD_IOP_MMHG: 17
OS_IOP_MMHG: 20

## 2025-01-28 ASSESSMENT — VISUAL ACUITY
OS_BCVA: 20/20
OD_BCVA: 20/30+1

## 2025-03-11 ENCOUNTER — OFFICE (OUTPATIENT)
Dept: URBAN - METROPOLITAN AREA CLINIC 94 | Facility: CLINIC | Age: 70
Setting detail: OPHTHALMOLOGY
End: 2025-03-11
Payer: COMMERCIAL

## 2025-03-11 DIAGNOSIS — H35.372: ICD-10-CM

## 2025-03-11 PROCEDURE — 99024 POSTOP FOLLOW-UP VISIT: CPT | Performed by: OPHTHALMOLOGY

## 2025-03-11 ASSESSMENT — KERATOMETRY
METHOD_AUTO_MANUAL: AUTO
OD_K1POWER_DIOPTERS: 45.50
OD_K2POWER_DIOPTERS: 45.50
OS_K2POWER_DIOPTERS: 45.75
OS_K1POWER_DIOPTERS: 45.50
OS_AXISANGLE_DEGREES: 023
OD_AXISANGLE_DEGREES: 090

## 2025-03-11 ASSESSMENT — TONOMETRY
OS_IOP_MMHG: 21
OD_IOP_MMHG: 20

## 2025-03-11 ASSESSMENT — REFRACTION_AUTOREFRACTION
OS_AXIS: 099
OD_SPHERE: -2.50
OD_AXIS: 096
OS_SPHERE: -2.50
OD_CYLINDER: -0.25
OS_CYLINDER: -0.25

## 2025-03-11 ASSESSMENT — VISUAL ACUITY
OS_BCVA: 20/25+1
OD_BCVA: 20/30

## 2025-03-11 ASSESSMENT — CONFRONTATIONAL VISUAL FIELD TEST (CVF)
OD_FINDINGS: FULL
OS_FINDINGS: FULL

## 2025-07-22 ENCOUNTER — OFFICE (OUTPATIENT)
Dept: URBAN - METROPOLITAN AREA CLINIC 94 | Facility: CLINIC | Age: 70
Setting detail: OPHTHALMOLOGY
End: 2025-07-22
Payer: COMMERCIAL

## 2025-07-22 DIAGNOSIS — H35.373: ICD-10-CM

## 2025-07-22 DIAGNOSIS — H43.813: ICD-10-CM

## 2025-07-22 PROCEDURE — 92134 CPTRZ OPH DX IMG PST SGM RTA: CPT | Performed by: OPHTHALMOLOGY

## 2025-07-22 PROCEDURE — 92014 COMPRE OPH EXAM EST PT 1/>: CPT | Performed by: OPHTHALMOLOGY

## 2025-07-22 ASSESSMENT — REFRACTION_AUTOREFRACTION
OD_AXIS: 096
OS_SPHERE: -2.50
OD_SPHERE: -2.50
OS_CYLINDER: -0.25
OS_AXIS: 099
OD_CYLINDER: -0.25

## 2025-07-22 ASSESSMENT — KERATOMETRY
OD_K1POWER_DIOPTERS: 45.50
METHOD_AUTO_MANUAL: AUTO
OS_AXISANGLE_DEGREES: 023
OS_K1POWER_DIOPTERS: 45.50
OD_AXISANGLE_DEGREES: 090
OS_K2POWER_DIOPTERS: 45.75
OD_K2POWER_DIOPTERS: 45.50

## 2025-07-22 ASSESSMENT — TONOMETRY
OD_IOP_MMHG: 17
OS_IOP_MMHG: 17

## 2025-07-22 ASSESSMENT — CONFRONTATIONAL VISUAL FIELD TEST (CVF)
OD_FINDINGS: FULL
OS_FINDINGS: FULL

## 2025-07-22 ASSESSMENT — VISUAL ACUITY
OD_BCVA: 20/30+1
OS_BCVA: 20/25